# Patient Record
Sex: FEMALE | Race: BLACK OR AFRICAN AMERICAN | Employment: OTHER | URBAN - METROPOLITAN AREA
[De-identification: names, ages, dates, MRNs, and addresses within clinical notes are randomized per-mention and may not be internally consistent; named-entity substitution may affect disease eponyms.]

---

## 2020-10-21 ENCOUNTER — HOSPITAL ENCOUNTER (EMERGENCY)
Age: 20
Discharge: LEFT BEFORE DISCHARGE | End: 2020-10-21
Attending: EMERGENCY MEDICINE

## 2020-10-21 VITALS
DIASTOLIC BLOOD PRESSURE: 80 MMHG | TEMPERATURE: 97.9 F | RESPIRATION RATE: 18 BRPM | SYSTOLIC BLOOD PRESSURE: 126 MMHG | HEART RATE: 90 BPM | OXYGEN SATURATION: 100 %

## 2020-10-21 PROCEDURE — 75810000275 HC EMERGENCY DEPT VISIT NO LEVEL OF CARE

## 2020-10-21 NOTE — ED TRIAGE NOTES
Patient allergic to all types of nuts. Ate walnuts unknowingly about 2 hours ago. Felt like lips were itching and throat felt funny. Rash developed on bottom. Patient took 2 50mg benadryl. Throat and lips feeling better. No difficulty breathing, swallowing, or maintaining secretions. Now feeling nauseated and had diarrhea 3 times.

## 2021-03-11 NOTE — ED NOTES
Patient entered triage and stated she was going home. States she is sleepy from the benadryl and her lips and throat feel fine. Patient encourage to stay and see provider. Patient declined and stated she would return if symptoms returned or worsened. 11-Mar-2021 12:46

## 2021-07-08 ENCOUNTER — HOSPITAL ENCOUNTER (EMERGENCY)
Facility: HOSPITAL | Age: 21
Discharge: HOME | End: 2021-07-08
Attending: EMERGENCY MEDICINE
Payer: OTHER GOVERNMENT

## 2021-07-08 VITALS
RESPIRATION RATE: 18 BRPM | BODY MASS INDEX: 24.8 KG/M2 | WEIGHT: 140 LBS | SYSTOLIC BLOOD PRESSURE: 114 MMHG | HEIGHT: 63 IN | DIASTOLIC BLOOD PRESSURE: 73 MMHG | TEMPERATURE: 99 F | OXYGEN SATURATION: 100 % | HEART RATE: 64 BPM

## 2021-07-08 DIAGNOSIS — T78.40XA ALLERGIC REACTION, INITIAL ENCOUNTER: Primary | ICD-10-CM

## 2021-07-08 LAB
B-HCG UR QL: NEGATIVE
CTP QC/QA: YES

## 2021-07-08 PROCEDURE — 99283 EMERGENCY DEPT VISIT LOW MDM: CPT

## 2021-07-08 PROCEDURE — 81025 URINE PREGNANCY TEST: CPT | Performed by: EMERGENCY MEDICINE

## 2021-07-08 RX ORDER — DEXAMETHASONE SODIUM PHOSPHATE 4 MG/ML
8 INJECTION, SOLUTION INTRA-ARTICULAR; INTRALESIONAL; INTRAMUSCULAR; INTRAVENOUS; SOFT TISSUE
Status: DISCONTINUED | OUTPATIENT
Start: 2021-07-08 | End: 2021-07-08 | Stop reason: HOSPADM

## 2021-07-08 RX ORDER — EPINEPHRINE 0.3 MG/.3ML
1 INJECTION SUBCUTANEOUS
Qty: 1 DEVICE | Refills: 0 | Status: SHIPPED | OUTPATIENT
Start: 2021-07-08 | End: 2022-07-08

## 2021-09-22 ENCOUNTER — PATIENT OUTREACH (OUTPATIENT)
Dept: EMERGENCY MEDICINE | Facility: HOSPITAL | Age: 21
End: 2021-09-22

## 2021-09-22 ENCOUNTER — HOSPITAL ENCOUNTER (EMERGENCY)
Facility: HOSPITAL | Age: 21
Discharge: HOME | End: 2021-09-22
Attending: EMERGENCY MEDICINE
Payer: OTHER GOVERNMENT

## 2021-09-22 VITALS
BODY MASS INDEX: 24.8 KG/M2 | HEIGHT: 63 IN | SYSTOLIC BLOOD PRESSURE: 116 MMHG | HEART RATE: 85 BPM | TEMPERATURE: 99 F | RESPIRATION RATE: 18 BRPM | OXYGEN SATURATION: 100 % | DIASTOLIC BLOOD PRESSURE: 69 MMHG

## 2021-09-22 DIAGNOSIS — R63.0 ANOREXIA: Primary | ICD-10-CM

## 2021-09-22 DIAGNOSIS — R10.32 LEFT LOWER QUADRANT ABDOMINAL PAIN: ICD-10-CM

## 2021-09-22 LAB
ALBUMIN SERPL BCP-MCNC: 4.2 G/DL (ref 3.5–5.2)
ALP SERPL-CCNC: 61 U/L (ref 55–135)
ALT SERPL W/O P-5'-P-CCNC: 6 U/L (ref 10–44)
ANION GAP SERPL CALC-SCNC: 11 MMOL/L (ref 8–16)
AST SERPL-CCNC: 14 U/L (ref 10–40)
B-HCG UR QL: NEGATIVE
BACTERIA #/AREA URNS HPF: ABNORMAL /HPF
BASOPHILS # BLD AUTO: 0.04 K/UL (ref 0–0.2)
BASOPHILS NFR BLD: 0.4 % (ref 0–1.9)
BILIRUB SERPL-MCNC: 0.6 MG/DL (ref 0.1–1)
BILIRUB UR QL STRIP: NEGATIVE
BUN SERPL-MCNC: 9 MG/DL (ref 6–20)
CALCIUM SERPL-MCNC: 9.8 MG/DL (ref 8.7–10.5)
CHLORIDE SERPL-SCNC: 106 MMOL/L (ref 95–110)
CLARITY UR: ABNORMAL
CO2 SERPL-SCNC: 21 MMOL/L (ref 23–29)
COLOR UR: YELLOW
CREAT SERPL-MCNC: 0.9 MG/DL (ref 0.5–1.4)
CTP QC/QA: YES
CTP QC/QA: YES
DIFFERENTIAL METHOD: ABNORMAL
EOSINOPHIL # BLD AUTO: 0.1 K/UL (ref 0–0.5)
EOSINOPHIL NFR BLD: 0.9 % (ref 0–8)
ERYTHROCYTE [DISTWIDTH] IN BLOOD BY AUTOMATED COUNT: 12.8 % (ref 11.5–14.5)
EST. GFR  (AFRICAN AMERICAN): >60 ML/MIN/1.73 M^2
EST. GFR  (NON AFRICAN AMERICAN): >60 ML/MIN/1.73 M^2
GLUCOSE SERPL-MCNC: 76 MG/DL (ref 70–110)
GLUCOSE UR QL STRIP: NEGATIVE
HCT VFR BLD AUTO: 38 % (ref 37–48.5)
HGB BLD-MCNC: 13.2 G/DL (ref 12–16)
HGB UR QL STRIP: ABNORMAL
HYALINE CASTS #/AREA URNS LPF: 0 /LPF
IMM GRANULOCYTES # BLD AUTO: 0.04 K/UL (ref 0–0.04)
IMM GRANULOCYTES NFR BLD AUTO: 0.4 % (ref 0–0.5)
KETONES UR QL STRIP: ABNORMAL
LEUKOCYTE ESTERASE UR QL STRIP: ABNORMAL
LIPASE SERPL-CCNC: 6 U/L (ref 4–60)
LYMPHOCYTES # BLD AUTO: 1.2 K/UL (ref 1–4.8)
LYMPHOCYTES NFR BLD: 12.7 % (ref 18–48)
MCH RBC QN AUTO: 31.9 PG (ref 27–31)
MCHC RBC AUTO-ENTMCNC: 34.7 G/DL (ref 32–36)
MCV RBC AUTO: 92 FL (ref 82–98)
MICROSCOPIC COMMENT: ABNORMAL
MONOCYTES # BLD AUTO: 0.5 K/UL (ref 0.3–1)
MONOCYTES NFR BLD: 5.9 % (ref 4–15)
NEUTROPHILS # BLD AUTO: 7.4 K/UL (ref 1.8–7.7)
NEUTROPHILS NFR BLD: 79.7 % (ref 38–73)
NITRITE UR QL STRIP: NEGATIVE
NRBC BLD-RTO: 0 /100 WBC
PH UR STRIP: 6 [PH] (ref 5–8)
PLATELET # BLD AUTO: 214 K/UL (ref 150–450)
PMV BLD AUTO: 11.3 FL (ref 9.2–12.9)
POTASSIUM SERPL-SCNC: 4 MMOL/L (ref 3.5–5.1)
PROT SERPL-MCNC: 7.8 G/DL (ref 6–8.4)
PROT UR QL STRIP: ABNORMAL
RBC # BLD AUTO: 4.14 M/UL (ref 4–5.4)
RBC #/AREA URNS HPF: >100 /HPF (ref 0–4)
SARS-COV-2 RDRP RESP QL NAA+PROBE: NEGATIVE
SODIUM SERPL-SCNC: 138 MMOL/L (ref 136–145)
SP GR UR STRIP: >1.03 (ref 1–1.03)
URN SPEC COLLECT METH UR: ABNORMAL
UROBILINOGEN UR STRIP-ACNC: ABNORMAL EU/DL
WBC # BLD AUTO: 9.22 K/UL (ref 3.9–12.7)
WBC #/AREA URNS HPF: 11 /HPF (ref 0–5)

## 2021-09-22 PROCEDURE — 81000 URINALYSIS NONAUTO W/SCOPE: CPT | Performed by: PHYSICIAN ASSISTANT

## 2021-09-22 PROCEDURE — 63600175 PHARM REV CODE 636 W HCPCS: Performed by: PHYSICIAN ASSISTANT

## 2021-09-22 PROCEDURE — 85025 COMPLETE CBC W/AUTO DIFF WBC: CPT | Performed by: PHYSICIAN ASSISTANT

## 2021-09-22 PROCEDURE — 83690 ASSAY OF LIPASE: CPT | Performed by: PHYSICIAN ASSISTANT

## 2021-09-22 PROCEDURE — 99284 EMERGENCY DEPT VISIT MOD MDM: CPT | Mod: 25

## 2021-09-22 PROCEDURE — 87086 URINE CULTURE/COLONY COUNT: CPT | Performed by: PHYSICIAN ASSISTANT

## 2021-09-22 PROCEDURE — 80053 COMPREHEN METABOLIC PANEL: CPT | Performed by: PHYSICIAN ASSISTANT

## 2021-09-22 PROCEDURE — 96374 THER/PROPH/DIAG INJ IV PUSH: CPT

## 2021-09-22 PROCEDURE — U0002 COVID-19 LAB TEST NON-CDC: HCPCS | Performed by: PHYSICIAN ASSISTANT

## 2021-09-22 PROCEDURE — 96361 HYDRATE IV INFUSION ADD-ON: CPT

## 2021-09-22 PROCEDURE — 81025 URINE PREGNANCY TEST: CPT | Performed by: PHYSICIAN ASSISTANT

## 2021-09-22 RX ORDER — KETOROLAC TROMETHAMINE 30 MG/ML
15 INJECTION, SOLUTION INTRAMUSCULAR; INTRAVENOUS
Status: COMPLETED | OUTPATIENT
Start: 2021-09-22 | End: 2021-09-22

## 2021-09-22 RX ADMIN — KETOROLAC TROMETHAMINE 15 MG: 30 INJECTION, SOLUTION INTRAMUSCULAR; INTRAVENOUS at 08:09

## 2021-09-24 LAB — BACTERIA UR CULT: NORMAL

## 2021-09-29 ENCOUNTER — PATIENT OUTREACH (OUTPATIENT)
Dept: EMERGENCY MEDICINE | Facility: HOSPITAL | Age: 21
End: 2021-09-29

## 2021-09-30 ENCOUNTER — PATIENT OUTREACH (OUTPATIENT)
Dept: EMERGENCY MEDICINE | Facility: HOSPITAL | Age: 21
End: 2021-09-30

## 2021-10-07 ENCOUNTER — PATIENT OUTREACH (OUTPATIENT)
Dept: EMERGENCY MEDICINE | Facility: HOSPITAL | Age: 21
End: 2021-10-07

## 2021-10-12 ENCOUNTER — PATIENT OUTREACH (OUTPATIENT)
Dept: EMERGENCY MEDICINE | Facility: HOSPITAL | Age: 21
End: 2021-10-12

## 2021-10-14 ENCOUNTER — PATIENT OUTREACH (OUTPATIENT)
Dept: EMERGENCY MEDICINE | Facility: HOSPITAL | Age: 21
End: 2021-10-14

## 2021-10-22 ENCOUNTER — PATIENT OUTREACH (OUTPATIENT)
Dept: EMERGENCY MEDICINE | Facility: HOSPITAL | Age: 21
End: 2021-10-22

## 2022-02-01 ENCOUNTER — HOSPITAL ENCOUNTER (EMERGENCY)
Facility: HOSPITAL | Age: 22
Discharge: HOME | End: 2022-02-01
Attending: EMERGENCY MEDICINE
Payer: OTHER GOVERNMENT

## 2022-02-01 VITALS
HEIGHT: 64 IN | RESPIRATION RATE: 18 BRPM | TEMPERATURE: 99 F | WEIGHT: 135 LBS | HEART RATE: 80 BPM | DIASTOLIC BLOOD PRESSURE: 69 MMHG | BODY MASS INDEX: 23.05 KG/M2 | SYSTOLIC BLOOD PRESSURE: 108 MMHG | OXYGEN SATURATION: 100 %

## 2022-02-01 DIAGNOSIS — M79.644 FINGER PAIN, RIGHT: Primary | ICD-10-CM

## 2022-02-01 PROCEDURE — 99283 EMERGENCY DEPT VISIT LOW MDM: CPT

## 2022-02-01 RX ORDER — IBUPROFEN 600 MG/1
600 TABLET ORAL EVERY 6 HOURS PRN
Qty: 20 TABLET | Refills: 0 | Status: SHIPPED | OUTPATIENT
Start: 2022-02-01 | End: 2023-02-24

## 2022-02-10 NOTE — ED PROVIDER NOTES
Encounter Date: 2/1/2022       History     Chief Complaint   Patient presents with    Hand Pain     Pt chief complaint is a finger injury, pt states was bleeding a lot, bleeding controlled pta.      21-year-old female presents complaining of injury to right pinky finger today.  Patient states she slammed her finger in a door.  Associated swelling and pain with movement.        Review of patient's allergies indicates:   Allergen Reactions    Nuts [tree nut]      Past Medical History:   Diagnosis Date    IBS (irritable bowel syndrome)      History reviewed. No pertinent surgical history.  History reviewed. No pertinent family history.  Social History     Tobacco Use    Smoking status: Never Smoker    Smokeless tobacco: Never Used   Substance Use Topics    Alcohol use: Never    Drug use: Never     Review of Systems   Constitutional: Negative for fever.   Cardiovascular: Negative for chest pain.   Gastrointestinal: Negative for anal bleeding.   Musculoskeletal: Positive for arthralgias (Right 5th digit pain).       Physical Exam     Initial Vitals [02/01/22 1539]   BP Pulse Resp Temp SpO2   108/63 85 16 98.3 °F (36.8 °C) 100 %      MAP       --         Physical Exam    Constitutional: She appears well-developed and well-nourished.   Cardiovascular: Normal rate, regular rhythm, normal heart sounds and intact distal pulses.   Pulmonary/Chest: Breath sounds normal.   Musculoskeletal:         General: Tenderness (Right 5th digit.  Full range of motion.) present.     Neurological: She is alert and oriented to person, place, and time. She has normal strength.   Psychiatric: She has a normal mood and affect.         ED Course   Procedures  Labs Reviewed - No data to display       Imaging Results          X-Ray Finger 2 or More Views Right (Final result)  Result time 02/01/22 17:11:20    Final result by Marii Kidd MD (02/01/22 17:11:20)                 Impression:      No acute bony abnormality  detected.      Electronically signed by: Marii Kidd  Date:    02/01/2022  Time:    17:11             Narrative:    EXAMINATION:  TWO VIEWS OR MORE VIEWS RIGHT FINGER    CLINICAL HISTORY:  5th finger injury;    TECHNIQUE:  AP, oblique, and lateral view of the right finger    COMPARISON:  None.    FINDINGS:  Two or more views of the right 5th finger demonstrate no acute fracture or dislocation.                                 Medications - No data to display  Medical Decision Making:   Initial Assessment:   No evidence of fracture seen on x-ray.  I suspect patient  has a sprain.  Patient treated with supportive care.  Patient is stable for discharge.                      Clinical Impression:   Final diagnoses:  [M79.644] Finger pain, right (Primary)          ED Disposition Condition    Discharge Stable        ED Prescriptions     Medication Sig Dispense Start Date End Date Auth. Provider    ibuprofen (ADVIL,MOTRIN) 600 MG tablet Take 1 tablet (600 mg total) by mouth every 6 (six) hours as needed for Pain. 20 tablet 2/1/2022  SAVANNA Alexander        Follow-up Information    None          SAVANNA Alexander  02/09/22 1910

## 2022-07-13 ENCOUNTER — HOSPITAL ENCOUNTER (EMERGENCY)
Facility: HOSPITAL | Age: 22
Discharge: HOME | End: 2022-07-13
Attending: EMERGENCY MEDICINE
Payer: OTHER GOVERNMENT

## 2022-07-13 VITALS
WEIGHT: 140 LBS | RESPIRATION RATE: 18 BRPM | BODY MASS INDEX: 23.9 KG/M2 | DIASTOLIC BLOOD PRESSURE: 76 MMHG | OXYGEN SATURATION: 100 % | HEART RATE: 98 BPM | TEMPERATURE: 98 F | HEIGHT: 64 IN | SYSTOLIC BLOOD PRESSURE: 114 MMHG

## 2022-07-13 DIAGNOSIS — S61.212A LACERATION OF RIGHT MIDDLE FINGER WITHOUT DAMAGE TO NAIL, FOREIGN BODY PRESENCE UNSPECIFIED, INITIAL ENCOUNTER: Primary | ICD-10-CM

## 2022-07-13 LAB
B-HCG UR QL: NEGATIVE
CTP QC/QA: YES

## 2022-07-13 PROCEDURE — 12001 RPR S/N/AX/GEN/TRNK 2.5CM/<: CPT | Mod: F7

## 2022-07-13 PROCEDURE — 81025 URINE PREGNANCY TEST: CPT | Performed by: PHYSICIAN ASSISTANT

## 2022-07-13 PROCEDURE — 99283 EMERGENCY DEPT VISIT LOW MDM: CPT | Mod: 25

## 2022-07-13 RX ORDER — ACETAMINOPHEN 500 MG
500 TABLET ORAL EVERY 4 HOURS PRN
Qty: 20 TABLET | Refills: 0 | Status: SHIPPED | OUTPATIENT
Start: 2022-07-13 | End: 2022-07-18

## 2022-07-13 RX ORDER — LIDOCAINE HYDROCHLORIDE 10 MG/ML
10 INJECTION INFILTRATION; PERINEURAL
Status: DISCONTINUED | OUTPATIENT
Start: 2022-07-13 | End: 2022-07-13 | Stop reason: HOSPADM

## 2022-07-13 NOTE — FIRST PROVIDER EVALUATION
Medical screening exam completed.  I have conducted a focused provider triage encounter, findings are as follows:    Brief history of present illness:  Cut 3rd digit of R hand on a metal can prior to arrival. Tetanus utd    4/10       There were no vitals filed for this visit.    Pertinent physical exam:  Laceration to volar aspect of 3rd digit of r hand  Able to flex and extend   Brief workup plan:  mariajose    Preliminary workup initiated; this workup will be continued and followed by the physician or advanced practice provider that is assigned to the patient when roomed.

## 2022-07-13 NOTE — Clinical Note
"Priti Ericksonlin" Ian was seen and treated in our emergency department on 7/13/2022.  She may return to work on 07/16/2022.       If you have any questions or concerns, please don't hesitate to call.      Theresa Shay PA-C"

## 2022-07-14 NOTE — ED PROVIDER NOTES
Encounter Date: 7/13/2022    SCRIBE #1 NOTE: I, Patricia Love, am scribing for, and in the presence of,  Theresa Shay PA-C. I have scribed the following portions of the note - Other sections scribed: HPI, ROS, PE.       History     Chief Complaint   Patient presents with    Laceration     Patient reports that she cut her right 3rd digit on a open can of meat. She states that she is up to date on her vaccines, including tetanus. Denies numbness/tingling to right hand.     A 21 y.o. female with no pertinent PMHx, presents to the ED for evaluation of a laceration to the 3rd digit of the right hand that occurred today. Pt reports cutting her hand on a metal can. She rates her pain as a 4/10. Her Tetanus vaccine is up to date. No other exacerbating or alleviating factors. Patient denies weakness, numbness, tingling, dizziness, lightheadedness, shortness of breath, chest pain, nausea, vomiting, diarrhea, abdominal pain, back pain, nasal congestion, rhinorrhea, fever, dysuria, or any other associated symptoms.     The history is provided by the patient. No  was used.     Review of patient's allergies indicates:   Allergen Reactions    Ibuprofen Anaphylaxis    Nuts [tree nut]      Past Medical History:   Diagnosis Date    IBS (irritable bowel syndrome)      No past surgical history on file.  No family history on file.  Social History     Tobacco Use    Smoking status: Never Smoker    Smokeless tobacco: Never Used   Substance Use Topics    Alcohol use: Never    Drug use: Never     Review of Systems   Constitutional: Negative for fever.   HENT: Negative for congestion and rhinorrhea.    Eyes: Negative for photophobia.   Respiratory: Negative for shortness of breath.    Cardiovascular: Negative for chest pain.   Gastrointestinal: Negative for abdominal pain, diarrhea, nausea and vomiting.   Genitourinary: Negative for dysuria.   Musculoskeletal: Negative for back pain.   Skin:         (+) Laceration to the 3rd digit of the right hand   Neurological: Negative for dizziness, weakness, light-headedness and numbness.        (-) Tingling       Physical Exam     Initial Vitals [07/13/22 1839]   BP Pulse Resp Temp SpO2   112/66 80 16 98.2 °F (36.8 °C) 98 %      MAP       --         Physical Exam    Nursing note and vitals reviewed.  Constitutional: She appears well-developed.   HENT:   Right Ear: External ear normal.   Left Ear: External ear normal.   Cardiovascular: Normal rate and regular rhythm.   Pulses:       Dorsalis pedis pulses are 2+ on the right side and 2+ on the left side.   Pulmonary/Chest: She has no wheezes. She has no rhonchi. She has no rales.   Abdominal: There is no abdominal tenderness. There is no rebound and no guarding.   Musculoskeletal:      Comments: No bony tenderness with palpation. 1 cm superficial laceration to the volar aspect of the 3rd digit of the right hand just distal to the PIP joint. Patient is able to flex and extend digit.      Neurological: She is alert and oriented to person, place, and time. No sensory deficit.   Psychiatric: She has a normal mood and affect.         ED Course   Lac Repair    Date/Time: 7/13/2022 9:17 PM  Performed by: Theresa Shay PA-C  Authorized by: Johanne Shine MD     Consent:     Consent obtained:  Verbal    Consent given by:  Patient  Universal protocol:     Patient identity confirmed:  Verbally with patient  Exploration:     Hemostasis achieved with:  Direct pressure  Skin repair:     Repair method:  Tissue adhesive  Approximation:     Approximation:  Close  Repair type:     Repair type:  Simple  Post-procedure details:     Procedure completion:  Tolerated well, no immediate complications      Labs Reviewed   POCT URINE PREGNANCY          Imaging Results    None          Medications   LIDOcaine HCL 10 mg/ml (1%) injection 10 mL (has no administration in time range)     Medical Decision Making:   History:   Old Medical  Records: I decided to obtain old medical records.  ED Management:  22 y/o F UTD on tetanus presenting for lac to R 3rd digit  Exam above. No neurovascular deficits. No evidence of tendon injuiry. No bony ttp to indicate fracture.   Considered but doubt foreign body given superficial location. Repaired per procedure note. Offered sutures but pt opting for repair with glue. Splint applied to help prevent movement.   pcp follow up. Return to ER for worsnein gsymptoms or as needed          Scribe Attestation:   Scribe #1: I performed the above scribed service and the documentation accurately describes the services I performed. I attest to the accuracy of the note.                 Clinical Impression:   Final diagnoses:  [S67.216E] Laceration of right middle finger without damage to nail, foreign body presence unspecified, initial encounter (Primary)          ED Disposition Condition    Discharge Stable        ED Prescriptions     Medication Sig Dispense Start Date End Date Auth. Provider    acetaminophen (TYLENOL) 500 MG tablet Take 1 tablet (500 mg total) by mouth every 4 (four) hours as needed. 20 tablet 7/13/2022 7/18/2022 Theresa Shay PA-C        Follow-up Information     Follow up With Specialties Details Why Contact Info    [facility] [place]on  Schedule an appointment as soon as possible for a visit in 2 days for follow up [address]  [facility] [address]  [phone]      [facility] [place] Emergency Medicine Go to  As needed, If symptoms worsen [address] [place] [address] [phone]        I, Theresa Shay PA-C , personally performed the services described in this documentation. All medical record entries made by the scribe were at my direction and in my presence. I have reviewed the chart and agree that the record reflects my personal performance and is accurate and complete.       Theresa Shay PA-C  07/13/22 3325

## 2022-07-14 NOTE — ED NOTES
Bed: 34qTrk  Expected date:   Expected time:   Means of arrival: Personal Transportation  Comments:

## 2023-02-24 ENCOUNTER — HOSPITAL ENCOUNTER (EMERGENCY)
Facility: HOSPITAL | Age: 23
Discharge: HOME | End: 2023-02-24
Attending: INTERNAL MEDICINE
Payer: OTHER GOVERNMENT

## 2023-02-24 VITALS
DIASTOLIC BLOOD PRESSURE: 68 MMHG | HEIGHT: 64 IN | RESPIRATION RATE: 20 BRPM | SYSTOLIC BLOOD PRESSURE: 117 MMHG | BODY MASS INDEX: 23.9 KG/M2 | WEIGHT: 140 LBS | TEMPERATURE: 99 F | OXYGEN SATURATION: 100 % | HEART RATE: 76 BPM

## 2023-02-24 DIAGNOSIS — Z3A.01 LESS THAN 8 WEEKS GESTATION OF PREGNANCY: ICD-10-CM

## 2023-02-24 DIAGNOSIS — R11.0 NAUSEA: Primary | ICD-10-CM

## 2023-02-24 LAB
ALBUMIN SERPL BCP-MCNC: 3.7 G/DL (ref 3.5–5.2)
ALP SERPL-CCNC: 49 U/L (ref 55–135)
ALT SERPL W/O P-5'-P-CCNC: 10 U/L (ref 10–44)
ANION GAP SERPL CALC-SCNC: 8 MMOL/L (ref 8–16)
AST SERPL-CCNC: 14 U/L (ref 10–40)
B-HCG UR QL: POSITIVE
BACTERIA #/AREA URNS HPF: NORMAL /HPF
BASOPHILS # BLD AUTO: 0.04 K/UL (ref 0–0.2)
BASOPHILS NFR BLD: 0.4 % (ref 0–1.9)
BILIRUB SERPL-MCNC: 0.3 MG/DL (ref 0.1–1)
BILIRUB UR QL STRIP: NEGATIVE
BUN SERPL-MCNC: 7 MG/DL (ref 6–20)
CALCIUM SERPL-MCNC: 8.1 MG/DL (ref 8.7–10.5)
CHLORIDE SERPL-SCNC: 111 MMOL/L (ref 95–110)
CLARITY UR: CLEAR
CO2 SERPL-SCNC: 20 MMOL/L (ref 23–29)
COLOR UR: YELLOW
CREAT SERPL-MCNC: 0.7 MG/DL (ref 0.5–1.4)
CTP QC/QA: YES
DIFFERENTIAL METHOD: ABNORMAL
EOSINOPHIL # BLD AUTO: 0.3 K/UL (ref 0–0.5)
EOSINOPHIL NFR BLD: 2.8 % (ref 0–8)
ERYTHROCYTE [DISTWIDTH] IN BLOOD BY AUTOMATED COUNT: 13.5 % (ref 11.5–14.5)
EST. GFR  (NO RACE VARIABLE): >60 ML/MIN/1.73 M^2
GLUCOSE SERPL-MCNC: 82 MG/DL (ref 70–110)
GLUCOSE UR QL STRIP: NEGATIVE
HCG INTACT+B SERPL-ACNC: 3639 MIU/ML
HCT VFR BLD AUTO: 33.8 % (ref 37–48.5)
HGB BLD-MCNC: 11.6 G/DL (ref 12–16)
HGB UR QL STRIP: NEGATIVE
IMM GRANULOCYTES # BLD AUTO: 0.03 K/UL (ref 0–0.04)
IMM GRANULOCYTES NFR BLD AUTO: 0.3 % (ref 0–0.5)
KETONES UR QL STRIP: NEGATIVE
LEUKOCYTE ESTERASE UR QL STRIP: ABNORMAL
LIPASE SERPL-CCNC: 26 U/L (ref 4–60)
LYMPHOCYTES # BLD AUTO: 2.9 K/UL (ref 1–4.8)
LYMPHOCYTES NFR BLD: 30.3 % (ref 18–48)
MCH RBC QN AUTO: 31.5 PG (ref 27–31)
MCHC RBC AUTO-ENTMCNC: 34.3 G/DL (ref 32–36)
MCV RBC AUTO: 92 FL (ref 82–98)
MICROSCOPIC COMMENT: NORMAL
MONOCYTES # BLD AUTO: 0.5 K/UL (ref 0.3–1)
MONOCYTES NFR BLD: 5.4 % (ref 4–15)
NEUTROPHILS # BLD AUTO: 5.7 K/UL (ref 1.8–7.7)
NEUTROPHILS NFR BLD: 60.8 % (ref 38–73)
NITRITE UR QL STRIP: NEGATIVE
NRBC BLD-RTO: 0 /100 WBC
PH UR STRIP: 7 [PH] (ref 5–8)
PLATELET # BLD AUTO: 222 K/UL (ref 150–450)
PMV BLD AUTO: 11 FL (ref 9.2–12.9)
POTASSIUM SERPL-SCNC: 3.9 MMOL/L (ref 3.5–5.1)
PROT SERPL-MCNC: 6.8 G/DL (ref 6–8.4)
PROT UR QL STRIP: NEGATIVE
RBC # BLD AUTO: 3.68 M/UL (ref 4–5.4)
SODIUM SERPL-SCNC: 139 MMOL/L (ref 136–145)
SP GR UR STRIP: 1.02 (ref 1–1.03)
SQUAMOUS #/AREA URNS HPF: 4 /HPF
URN SPEC COLLECT METH UR: ABNORMAL
UROBILINOGEN UR STRIP-ACNC: NEGATIVE EU/DL
WBC # BLD AUTO: 9.42 K/UL (ref 3.9–12.7)
WBC #/AREA URNS HPF: 1 /HPF (ref 0–5)

## 2023-02-24 PROCEDURE — 84702 CHORIONIC GONADOTROPIN TEST: CPT

## 2023-02-24 PROCEDURE — 25000003 PHARM REV CODE 250

## 2023-02-24 PROCEDURE — 81000 URINALYSIS NONAUTO W/SCOPE: CPT

## 2023-02-24 PROCEDURE — 81025 URINE PREGNANCY TEST: CPT | Performed by: EMERGENCY MEDICINE

## 2023-02-24 PROCEDURE — 80053 COMPREHEN METABOLIC PANEL: CPT

## 2023-02-24 PROCEDURE — 85025 COMPLETE CBC W/AUTO DIFF WBC: CPT

## 2023-02-24 PROCEDURE — 83690 ASSAY OF LIPASE: CPT

## 2023-02-24 PROCEDURE — 99284 EMERGENCY DEPT VISIT MOD MDM: CPT | Mod: 25

## 2023-02-24 RX ORDER — SODIUM CHLORIDE 9 MG/ML
1000 INJECTION, SOLUTION INTRAVENOUS
Status: COMPLETED | OUTPATIENT
Start: 2023-02-24 | End: 2023-02-24

## 2023-02-24 RX ORDER — ACETAMINOPHEN 500 MG
500 TABLET ORAL EVERY 6 HOURS PRN
Qty: 56 TABLET | Refills: 0 | Status: SHIPPED | OUTPATIENT
Start: 2023-02-24 | End: 2023-03-10

## 2023-02-24 RX ADMIN — SODIUM CHLORIDE 1000 ML: 9 INJECTION, SOLUTION INTRAVENOUS at 09:02

## 2023-02-25 NOTE — ED PROVIDER NOTES
Encounter Date: 2/24/2023       History     Chief Complaint   Patient presents with    Abdominal Pain     Pt presents to ED c/o gen abd pain, back pain and nausea x 2 weeks.  Denies urinary symptoms, vaginal bleeding, vaginal d/c, n/v or any other assocaited      Priti Sosa is a 22-year-old female with no pertinent past medical history who presents to the emergency department with a chief complaint of abdominal cramping and nausea for the last 2 weeks.  Her abdominal cramping has been mostly lower abdominal and bilateral, described as crampy/achy, and with a maximal severity of 5 to 6/10.  Denies any radiation.  Denies any sharp or stabbing pains.  Denies any vaginal bleeding, vaginal discharge, or urinary complaints such as frequency, urgency, or dysuria.  She has not vomited, but she has been nauseous throughout the day every day. She denies both of these complaints presently.  She is mostly concerned that she may be pregnant.  Her last menstrual period was 4 weeks ago.  She does not have an OBGYN that she follows with.     The history is provided by the patient. No  was used.   Review of patient's allergies indicates:   Allergen Reactions    Ibuprofen Anaphylaxis    Nuts [tree nut]      Past Medical History:   Diagnosis Date    IBS (irritable bowel syndrome)      No past surgical history on file.  No family history on file.  Social History     Tobacco Use    Smoking status: Never    Smokeless tobacco: Never   Substance Use Topics    Alcohol use: Never    Drug use: Never     Review of Systems   Constitutional:  Negative for appetite change, chills and fever.   HENT:  Negative for sore throat.    Respiratory:  Negative for shortness of breath and wheezing.    Cardiovascular:  Negative for chest pain and palpitations.   Gastrointestinal:  Positive for abdominal pain (Cramping) and nausea. Negative for diarrhea and vomiting.   Genitourinary:  Negative for dysuria, frequency, hematuria, pelvic  pain, urgency, vaginal bleeding, vaginal discharge and vaginal pain.   Musculoskeletal:  Negative for back pain.   Skin:  Negative for rash.   Neurological:  Negative for weakness and headaches.   Hematological:  Does not bruise/bleed easily.     Physical Exam     Initial Vitals [02/24/23 2016]   BP Pulse Resp Temp SpO2   119/67 90 16 98.7 °F (37.1 °C) 100 %      MAP       --         Physical Exam    Nursing note and vitals reviewed.  Constitutional: Vital signs are normal. She appears well-developed and well-nourished. She is active and cooperative. She does not appear ill. No distress.   Clinically well-appearing.  Sitting comfortably in the exam bed.   HENT:   Head: Normocephalic and atraumatic.   Right Ear: Hearing and external ear normal.   Left Ear: Hearing and external ear normal.   Nose: No rhinorrhea. Right sinus exhibits no maxillary sinus tenderness and no frontal sinus tenderness. Left sinus exhibits no maxillary sinus tenderness and no frontal sinus tenderness.   Mouth/Throat: Oropharynx is clear and moist and mucous membranes are normal. Mucous membranes are not dry.   Eyes: Conjunctivae and EOM are normal.   Neck: Neck supple.    Full passive range of motion without pain.     Cardiovascular:  Normal rate, regular rhythm, S1 normal, S2 normal, normal heart sounds and normal pulses.           No murmur heard.  Pulses:       Radial pulses are 2+ on the right side and 2+ on the left side.        Dorsalis pedis pulses are 2+ on the right side and 2+ on the left side.   Pulmonary/Chest: Effort normal and breath sounds normal. No accessory muscle usage. No tachypnea. No respiratory distress.   Abdominal: Abdomen is soft and flat. Bowel sounds are normal. She exhibits no distension. There is no abdominal tenderness.   Normal abdominal exam.  No surgical scars.  No tenderness to palpation, distention, rigidity. There is no rebound and no guarding.   Musculoskeletal:      Cervical back: Full passive range of  motion without pain and neck supple. No muscular tenderness.     Neurological: She is alert.   Skin: Skin is warm and dry. Capillary refill takes less than 2 seconds. No rash noted. No pallor.       ED Course   Procedures  Labs Reviewed   URINALYSIS, REFLEX TO URINE CULTURE - Abnormal; Notable for the following components:       Result Value    Leukocytes, UA Trace (*)     All other components within normal limits    Narrative:     Specimen Source->Urine   CBC W/ AUTO DIFFERENTIAL - Abnormal; Notable for the following components:    RBC 3.68 (*)     Hemoglobin 11.6 (*)     Hematocrit 33.8 (*)     MCH 31.5 (*)     All other components within normal limits    Narrative:     Release to patient->Immediate   COMPREHENSIVE METABOLIC PANEL - Abnormal; Notable for the following components:    Chloride 111 (*)     CO2 20 (*)     Calcium 8.1 (*)     Alkaline Phosphatase 49 (*)     All other components within normal limits    Narrative:     Release to patient->Immediate   POCT URINE PREGNANCY - Abnormal; Notable for the following components:    POC Preg Test, Ur Positive (*)     All other components within normal limits   LIPASE    Narrative:     Release to patient->Immediate   HCG, QUANTITATIVE    Narrative:     Release to patient->Immediate   URINALYSIS MICROSCOPIC    Narrative:     Specimen Source->Urine          Imaging Results    None          Medications   0.9%  NaCl infusion (1,000 mLs Intravenous New Bag 2/24/23 2124)     Medical Decision Making:   Initial Assessment:   22-year-old female presenting to the emergency department with abdominal pain.  Differential Diagnosis:   Differential diagnosis is broad and includes but is not limited to gastroenteritis, appendicitis, pancreatitis, cholecystitis, diverticulitis, peritonitis, small-bowel obstruction, epiploic appendagitis, constipation, urinary tract infection including cystitis or pyelonephritis, ovarian torsion, ruptured ovarian cyst, pregnancy, ectopic pregnancy,  tubo-ovarian abscess, dysmenorrhea, pelvic inflammatory disease, sexually transmitted infection, vaginitis, cervicitis, musculoskeletal pain.   Clinical Tests:   Lab Tests: Ordered and Reviewed       <> Summary of Lab: CBC with very mild anemia, hemoglobin 11.6.  CMP with no gross abnormalities.  Lipase within normal limits.  UPT positive.  Quantitative hCG 3639.  Urinalysis with trace leukocytes, 1 WBC per high-powered field.  ED Management:  Patient presenting to the emergency department with a chief complaint of 2 weeks of abdominal cramping and nausea.  Concerned that she may be pregnant.  Denies any complaints at this time.  On physical exam, all vital signs are within normal limits.  She was clinically well-appearing.  Normal abdominal, cardiac, and lung exams.  UPT was positive.  Obtained basic labs, no gross abnormalities.  Patient denies vaginal bleeding, and there was no abdominal pain or tenderness to palpation on my exam.  As such, I have a very low suspicion for ectopic pregnancy.  Patient reports that her last menstrual period was 1 month ago, and with it being this early in her pregnancy, I did not obtain transvaginal ultrasound.  Patient will be referred to OBGYN for further management.    Prior to discharge, all vital signs are within normal limits and the patient was clinically well-appearing. Return precautions were discussed, all patient questions were answered, and the patient was agreeable to the plan of care.  She was discharged home in stable condition and will follow up with her primary care provider or return to the emergency department if her symptoms worsen or do not improve.                         Clinical Impression:   Final diagnoses:  [R11.0] Nausea (Primary)  [Z3A.01] Less than 8 weeks gestation of pregnancy        ED Disposition Condition    Discharge Stable          ED Prescriptions       Medication Sig Dispense Start Date End Date Auth. Provider    acetaminophen (TYLENOL) 500 MG  tablet Take 1 tablet (500 mg total) by mouth every 6 (six) hours as needed for Pain. 56 tablet 2/24/2023 3/10/2023 Lazaro Madden PA-C    doxylamine-pyridoxine, vit B6, (BONJESTA) 20-20 mg TbID Take 1 tablet by mouth 2 (two) times daily as needed (Nausea). 28 tablet 2/24/2023 -- Lazaro Madden PA-C          Follow-up Information       Follow up With Specialties Details Why Contact Info    [facility] [place]  Schedule an appointment as soon as possible for a visit  As needed [address] [facility] [address]  [phone]      [facility] [place] - Emergency Dept Emergency Medicine Go to  As needed [address] [facility] [address] [phone]    Estela Perez MD Obstetrics and Gynecology Schedule an appointment as soon as possible for a visit in 1 week  [address]  [address]  [facility] [address]  [phone]               Lazaro Madden PA-C  02/24/23 7136

## 2023-02-25 NOTE — DISCHARGE INSTRUCTIONS
Thank you for coming to our Emergency Department today. It is important to remember that some problems are difficult to diagnose and may not be found during your first visit. Be sure to follow up with your primary care doctor and review any labs/imaging that was performed with them. If you do not have a primary care doctor, you may contact the one listed on your discharge paperwork or you may also call the [facility] Appointment Desk at [phone] to schedule an appointment with one.     All medications may potentially have side effects and it is impossible to predict which medications may give you side effects. If you feel that you are having a negative effect of any medication you should immediately stop taking them and seek medical attention.    Return to the ER with any questions/concerns, new/concerning symptoms, worsening or failure to improve. Do not drive or make any important decisions for 24 hours if you have received any pain medications, sedatives or mood altering drugs during your ER visit.

## 2023-02-25 NOTE — ED TRIAGE NOTES
Pt reported ED with C/O of generalized abdominal pain, gas, and nausea for 2 weeks now. Denies any dark colored stools, diarrhea,or other symptoms. AAOx4, VSS

## 2023-02-28 ENCOUNTER — HOSPITAL ENCOUNTER (EMERGENCY)
Facility: HOSPITAL | Age: 23
Discharge: HOME | End: 2023-02-28
Attending: EMERGENCY MEDICINE
Payer: OTHER GOVERNMENT

## 2023-02-28 VITALS
BODY MASS INDEX: 23.9 KG/M2 | WEIGHT: 140 LBS | OXYGEN SATURATION: 100 % | DIASTOLIC BLOOD PRESSURE: 63 MMHG | RESPIRATION RATE: 20 BRPM | TEMPERATURE: 99 F | SYSTOLIC BLOOD PRESSURE: 113 MMHG | HEART RATE: 84 BPM | HEIGHT: 64 IN

## 2023-02-28 DIAGNOSIS — Z3A.01 LESS THAN 8 WEEKS GESTATION OF PREGNANCY: ICD-10-CM

## 2023-02-28 DIAGNOSIS — N93.9 VAGINAL SPOTTING: Primary | ICD-10-CM

## 2023-02-28 PROCEDURE — 99282 EMERGENCY DEPT VISIT SF MDM: CPT

## 2023-02-28 NOTE — ED PROVIDER NOTES
Encounter Date: 2/28/2023       History     Chief Complaint   Patient presents with    Vaginal Bleeding     Vaginal bleeding that started around 10pm last night reports about 4-5 weeks pregnant. When first noticed it was a lot and now noticing blood in pad and when wiping. +lower abdominal cramping. +nausea -v/d     22-year-old female presenting with vaginal spotting.  Patient is approximately 4-5 weeks pregnant.  She had blood work completed a few days ago when she presented to the hospital for evaluation of nausea, that is when she found out she was pregnant.  She had sexual intercourse 2 days ago.  Since that time she has had intermittent light vaginal spotting.  She denies noticing any blood in the toilet.  No abdominal pain or cramping.  No passage of clots.  She has a paddle on which has a few small specks of blood on it.  She appears well and nontoxic, her vital signs are normal.  Her blood pressure is 113/63.  This is her 1st pregnancy.  She has a past medical history of irritable bowel syndrome but not currently on any prescription medications, although she is taking prenatal vitamins.    Review of patient's allergies indicates:   Allergen Reactions    Ibuprofen Anaphylaxis    Nuts [tree nut]      Past Medical History:   Diagnosis Date    IBS (irritable bowel syndrome)      History reviewed. No pertinent surgical history.  History reviewed. No pertinent family history.  Social History     Tobacco Use    Smoking status: Never    Smokeless tobacco: Never   Substance Use Topics    Alcohol use: Never    Drug use: Never     Review of Systems   Constitutional:  Negative for fever.   HENT:  Negative for sore throat.    Respiratory:  Negative for shortness of breath.    Cardiovascular:  Negative for chest pain.   Gastrointestinal:  Negative for nausea.   Genitourinary:  Positive for vaginal bleeding. Negative for dysuria.   Musculoskeletal:  Negative for back pain.   Skin:  Negative for rash.   Neurological:   Negative for weakness.   Hematological:  Does not bruise/bleed easily.     Physical Exam     Initial Vitals [02/28/23 0444]   BP Pulse Resp Temp SpO2   113/63 84 20 98.8 °F (37.1 °C) 100 %      MAP       --         Physical Exam    Constitutional: Vital signs are normal. She appears well-developed and well-nourished.   HENT:   Head: Normocephalic and atraumatic.   Right Ear: Hearing normal.   Left Ear: Hearing normal.   Eyes: Conjunctivae are normal.   Cardiovascular:  Normal rate and regular rhythm.           Abdominal: Abdomen is soft. Bowel sounds are normal.   Soft and benign abdomen   Musculoskeletal:         General: Normal range of motion.     Neurological: She is alert and oriented to person, place, and time.   Skin: Skin is warm and intact.   Psychiatric: She has a normal mood and affect. Her speech is normal and behavior is normal. Cognition and memory are normal.       ED Course   Procedures  Labs Reviewed - No data to display       Imaging Results    None          Medications - No data to display  Medical Decision Making:   History:   Old Medical Records: I decided to obtain old medical records.  Old Records Summarized: records from clinic visits.  Initial Assessment:   22-year-old female with 1st trimester vaginal spotting for 48 hours  Differential Diagnosis:   Pregnancy of unknown viability or location, miscarriage, 1st trimester bleeding, implantation bleeding, postcoital bleeding  ED Management:  Plan:   22-year-old female with gestation less than 8 weeks with vaginal spotting.  Her spotting is minimal.  She does not have any cramps or abdominal pain.  She is not passing any clots.  No blood noted in the toilet.  Vital signs are normal.  HCG completed a couple of days ago.  Her physical exam is benign.  I considered ectopic pregnancy and other acute pelvic etiology but my suspicion for this is low given the minimal amount of bleeding that she is having and lack of abdominal pain.  She has not  saturated 1 pad.  Instead only having small specks of blood noted on the pad and minimal amount of blood when she wipes.  I did discuss everything with the patient and that the bleeding is likely secondary to implantation bleeding or possible post coitus bleeding from 2 days ago.  That being said I did offer her blood work and an ultrasound which at this time we will defer.  She was discharged home in stable condition, advised to follow-up with OBGYN and continue taking prenatal vitamins.                        Clinical Impression:   Final diagnoses:  [N93.9] Vaginal spotting (Primary)  [Z3A.01] Less than 8 weeks gestation of pregnancy        ED Disposition Condition    Discharge Stable          ED Prescriptions    None       Follow-up Information    None          Rob Murillo PA-C  02/28/23 0406

## 2023-02-28 NOTE — DISCHARGE INSTRUCTIONS
Follow-up with OBGYN and return to the ED as needed    Thank you for coming to our Emergency Department today. It is important to remember that some problems are difficult to diagnose and may not be found during your Emergency Department visit. Be sure to follow up with your primary care doctor and review all labs/imaging/tests that were performed during this visit with them. Some labs/tests may be outside of the normal range and require non-emergent follow-up and further investigation to help diagnose/exclude/prevent complications or other medical conditions.    If you do not have a primary care doctor, you may contact the one listed on your discharge paperwork or you may also call the [facility] Appointment Desk at [phone] to schedule an appointment and establish care with one. It is important to your health that you have a primary care doctor.    Please take all medications as directed. All medications may potentially have side-effects and it is impossible to predict which medications may give you side-effects or what side-effects (if any) they will give you.. If you feel that you are having a negative effect or side-effect of any medication you should immediately stop taking them and seek medical attention. If you feel that you are having a life-threatening reaction call 911.    Return to the ER with any questions/concerns, new/concerning symptoms, worsening or failure to improve.     Do not drive, swim, climb to height, take a bath or make any important decisions for 24 hours if you have received any pain medications, sedatives or mood altering drugs during your ER visit.